# Patient Record
(demographics unavailable — no encounter records)

---

## 2019-09-08 NOTE — NUR
ED Nurse Note:



Pt came in due to lower back pain x 3 days. Denies injury. No difficulty when 
she urinates. AAO x4 and ambulatory.

## 2019-09-08 NOTE — EMERGENCY ROOM REPORT
History of Present Illness


General


Chief Complaint:  Back Pain-No Injury


Source:  Patient





Present Illness


HPI


Patient is a 56-year-old female presenting for lower back pain.  She states 

that she was lifting up her mattress 3 days prior and felt a pain to the lower 

back.  Pain described as a 9 out of 10 dull ache and does not radiate from the 

back.  Worse with bending over and walking.  She has tried Tylenol which has 

not helped.  She denies other symptoms including numbness, tingling, fever, 

chills, abdominal pain, incontinence


Allergies:  


Coded Allergies:  


     CODEINE (Verified  Allergy, Mild, Hives, 6/19/13)





Patient History


Past Medical History:  see triage record


Pertinent Family History:  none


Last Menstrual Period:  hysterectomy 6 yrs ago


Reviewed Nursing Documentation:  PMH: Agreed; PSxH: Agreed





Nursing Documentation-PMH


Past Medical History:  No Stated History





Review of Systems


All Other Systems:  negative except mentioned in HPI





Physical Exam





Vital Signs








  Date Time  Temp Pulse Resp B/P (MAP) Pulse Ox O2 Delivery O2 Flow Rate FiO2


 


9/8/19 14:21 98.2 74 18 153/83 (106) 96 Room Air  








Sp02 EP Interpretation:  reviewed, normal


General Appearance:  no apparent distress, alert, GCS 15, non-toxic


Head:  normocephalic, atraumatic


Eyes:  bilateral eye normal inspection, bilateral eye PERRL


Cardiovascular #1:  regular rate, rhythm, no edema


Gastrointestinal:  normal bowel sounds, non tender, soft, non-distended, no 

guarding


Genitourinary:  no CVA tenderness


Musculoskeletal:  normal inspection, tender - TTP over bilat paraspinal muscles


Neurologic:  alert, oriented x3, responsive, motor strength/tone normal, 

sensory intact, speech normal


Psychiatric:  judgement/insight normal, memory normal, mood/affect normal, no 

suicidal/homicidal ideation





Medical Decision Making


PA Attestation


Dr. Price is my supervising physician. Patient management was discussed with 

my supervising physician


Diagnostic Impression:  


 Primary Impression:  


 Lumbar strain


 Qualified Codes:  S39.012A - Strain of muscle, fascia and tendon of lower back

, initial encounter


ER Course


Patient is a 56-year-old female presenting for lower back pain.





Ddx considered include but not limited to lumbar strain, degenerative disease, 

disc herniation, fracture





PE: Vitals stable. 


NAD


TTP over the Lumbar paraspinal muscles only. No midline tenderness or step-offs


Normal gait





Pt given IM Toradol and flexeril and feels significantly better. 





She will be DC'ed home with motrin and flexeril. Heating pad advised. F/U with 

PCP for further eval and treatment





Last Vital Signs








  Date Time  Temp Pulse Resp B/P (MAP) Pulse Ox O2 Delivery O2 Flow Rate FiO2


 


9/8/19 15:28 98.6 80 15 145/70 100 Room Air  








Status:  improved


Disposition:  HOME, SELF-CARE


Condition:  Improved


Scripts


Cyclobenzaprine Hcl* (FLEXERIL*) 10 Mg Tablet


10 MG ORAL THREE TIMES A DAY, #15 TAB


   Prov: MADELAINE ANDREWS         9/8/19 


Ibuprofen* (MOTRIN*) 600 Mg Tablet


600 MG ORAL Q8H PRN for For Pain, #30 TAB 0 Refills


   Prov: MADELAINE ANDREWS         9/8/19


Referrals:  


NON PHYSICIAN (PCP)


Patient Instructions:  Back Pain, Adult





Additional Instructions:  


I discussed my findings with the patient. All questions and concerns have been 

answered. Treatment and medication compliance have been addressed. I advised 

the patient that they need to follow up with Primary doctor in 3-5 days. Return 

to ED if pain remains or worsens, numbness or tingling occurs, new rash is 

noticed, fever is noticed, or if needed for any reason. Patient verbalized 

understanding of discharge instructions.











MADELAINE ANDREWS Sep 8, 2019 16:40